# Patient Record
Sex: FEMALE | Race: WHITE | NOT HISPANIC OR LATINO | URBAN - METROPOLITAN AREA
[De-identification: names, ages, dates, MRNs, and addresses within clinical notes are randomized per-mention and may not be internally consistent; named-entity substitution may affect disease eponyms.]

---

## 2022-02-18 ENCOUNTER — TELEPHONE (OUTPATIENT)
Dept: SCHEDULING | Facility: IMAGING CENTER | Age: 22
End: 2022-02-18

## 2022-09-27 ENCOUNTER — TELEPHONE (OUTPATIENT)
Dept: CARDIOLOGY | Facility: MEDICAL CENTER | Age: 22
End: 2022-09-27

## 2022-09-27 NOTE — TELEPHONE ENCOUNTER
Spoke with pt and confirmed this will be their first time seeing a cardiologist. Per pt has had a recent hospital stay at [facility]. Requested records such as notes of the hospital stay, labs, EKG tracings and any cardiac related testing. F#[phone]. Fax confirmation has been received and sent to scan through [employer].       Pending records.

## 2022-09-28 ENCOUNTER — OFFICE VISIT (OUTPATIENT)
Dept: CARDIOLOGY | Facility: MEDICAL CENTER | Age: 22
End: 2022-09-28
Payer: COMMERCIAL

## 2022-09-28 VITALS
RESPIRATION RATE: 13 BRPM | HEART RATE: 85 BPM | SYSTOLIC BLOOD PRESSURE: 126 MMHG | OXYGEN SATURATION: 97 % | WEIGHT: 128 LBS | HEIGHT: 67 IN | BODY MASS INDEX: 20.09 KG/M2 | DIASTOLIC BLOOD PRESSURE: 82 MMHG

## 2022-09-28 DIAGNOSIS — R07.9 CHEST PAIN, UNSPECIFIED TYPE: ICD-10-CM

## 2022-09-28 LAB — EKG IMPRESSION: NORMAL

## 2022-09-28 PROCEDURE — 99203 OFFICE O/P NEW LOW 30 MIN: CPT | Performed by: INTERNAL MEDICINE

## 2022-09-28 PROCEDURE — 93000 ELECTROCARDIOGRAM COMPLETE: CPT | Performed by: INTERNAL MEDICINE

## 2022-09-28 RX ORDER — NORETHINDRONE ACETATE AND ETHINYL ESTRADIOL .02; 1 MG/1; MG/1
1 TABLET ORAL
COMMUNITY
Start: 2022-08-21

## 2022-09-28 ASSESSMENT — ENCOUNTER SYMPTOMS
FALLS: 0
FOCAL WEAKNESS: 0
WEAKNESS: 0
FEVER: 0
DIZZINESS: 0
SHORTNESS OF BREATH: 0
SORE THROAT: 0
CLAUDICATION: 0
NAUSEA: 0
BRUISES/BLEEDS EASILY: 0
BLURRED VISION: 0
COUGH: 0
PALPITATIONS: 0
PND: 0
CHILLS: 0
ABDOMINAL PAIN: 0

## 2022-09-28 NOTE — PROGRESS NOTES
Chief Complaint   Patient presents with    New Patient       Subjective     Whitney Howard is a 22 y.o. 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 No pertinent past medical history.  History reviewed. No pertinent surgical history.  Family History   Problem Relation Age of Onset    Heart Disease Maternal Grandfather      Social History     Socioeconomic History    Marital status: Other     Spouse name: Not on file    Number of children: Not on file    Years of education: Not on file    Highest education level: Not on file   Occupational History    Not on file   Tobacco Use    Smoking status: Never    Smokeless tobacco: Never   Substance and Sexual Activity    Alcohol use: Yes     Comment: occ    Drug use: Never    Sexual activity: Not on file   Other Topics Concern    Not on file   Social History Narrative    Not on file     Social Determinants of Health     Financial Resource Strain: Not on file   Food Insecurity: Not on file   Transportation Needs: Not on file   Physical Activity: Not on file   Stress: Not on file   Social Connections: Not on file   Intimate Partner Violence: Not on file   Housing Stability: Not on file     No Known Allergies  No outpatient encounter medications on file as of 9/28/2022.     No facility-administered encounter medications on file as of 9/28/2022.     Review of Systems   Constitutional:  Negative for chills and fever.   HENT:  Negative for sore throat.    Eyes:  Negative for blurred vision.   Respiratory:  Negative for cough and shortness of breath.    Cardiovascular:  Negative for chest pain, palpitations, claudication, leg swelling and PND.   Gastrointestinal:  Negative for abdominal pain and nausea.   Musculoskeletal:  " Negative for falls and joint pain.   Skin:  Negative for rash.   Neurological:  Negative for dizziness, focal weakness and weakness.   Endo/Heme/Allergies:  Does not bruise/bleed easily.            Objective     /82 (BP Location: Left arm, Patient Position: Sitting, BP Cuff Size: Adult)   Pulse 85   Resp 13   Ht 1.702 m (5' 7\")   Wt 58.1 kg (128 lb)   SpO2 97%   BMI 20.05 kg/m²     Physical Exam  Constitutional:       General: She is not in acute distress.     Appearance: She is not diaphoretic.   HENT:      Mouth/Throat:      Comments: Wearing a mask for COVID protocol  Eyes:      General: No scleral icterus.  Neck:      Vascular: No JVD.   Cardiovascular:      Rate and Rhythm: Normal rate.      Heart sounds: Normal heart sounds. No murmur heard.    No friction rub. No gallop.   Pulmonary:      Effort: No respiratory distress.      Breath sounds: No wheezing or rales.   Abdominal:      General: Bowel sounds are normal.      Palpations: Abdomen is soft.   Musculoskeletal:      Right lower leg: No edema.      Left lower leg: No edema.   Skin:     Findings: No rash.   Neurological:      Mental Status: She is alert. Mental status is at baseline.   Psychiatric:         Mood and Affect: Mood normal.          Chest x-ray is reported normal and Camp's    Results for orders placed or performed in visit on 22   EKG   Result Value Ref Range    Report       [facility]    Test Date:  2022  Pt Name:    THONG DANIELSON                Department: [facility]  MRN:        [id number]                      Room:  Gender:     F                            Technician: CA  :        [id number]                   Requested By:JIGNESH GAINES  Order #:    [id number]                    Reading MD:    Measurements  Intervals                                Axis  Rate:       70                           P:          70  ND:         127                          QRS:        90  QRSD:       87                   "         T:          48  QT:         382  QTc:        413    Interpretive Statements  Sinus rhythm  Borderline right axis deviation  No previous ECG available for comparison         No results found for this or any previous visit.      Assessment & Plan     1. Chest pain, unspecified type  EKG          Medical Decision Making: Today's Assessment/Status/Plan:        No obvious abnormalities we discussed conservative monitoring certainly if she had symptoms or concerns she could do a CT calcium score which will give her better assessment of her anatomy or an echocardiogram but her heart exam sounds normal    Ms. Howard does not require regular cardiology follow up, I have advised her to call our office or e-mail using my [employer] if needed.    It is my pleasure to participate in the care of Ms. Howard.  Please do not hesitate to contact me with questions or concerns.    Michael Cross MD PhD [facility]  Cardiologist [facility]    Please note that this dictation was created using voice recognition software. There may be errors I did not discover before finalizing the note.